# Patient Record
Sex: MALE | Race: WHITE | NOT HISPANIC OR LATINO | Employment: UNEMPLOYED | URBAN - METROPOLITAN AREA
[De-identification: names, ages, dates, MRNs, and addresses within clinical notes are randomized per-mention and may not be internally consistent; named-entity substitution may affect disease eponyms.]

---

## 2023-04-12 ENCOUNTER — HOSPITAL ENCOUNTER (EMERGENCY)
Facility: CLINIC | Age: 28
Discharge: HOME | End: 2023-04-12
Attending: EMERGENCY MEDICINE | Admitting: EMERGENCY MEDICINE
Payer: COMMERCIAL

## 2023-04-12 VITALS
HEART RATE: 72 BPM | HEIGHT: 73 IN | WEIGHT: 182 LBS | BODY MASS INDEX: 24.12 KG/M2 | RESPIRATION RATE: 16 BRPM | SYSTOLIC BLOOD PRESSURE: 116 MMHG | DIASTOLIC BLOOD PRESSURE: 72 MMHG | OXYGEN SATURATION: 99 % | TEMPERATURE: 98.3 F

## 2023-04-12 DIAGNOSIS — F32.4 MAJOR DEPRESSIVE DISORDER, SINGLE EPISODE, IN PARTIAL REMISSION (H): ICD-10-CM

## 2023-04-12 DIAGNOSIS — F41.1 GAD (GENERALIZED ANXIETY DISORDER): ICD-10-CM

## 2023-04-12 LAB — SARS-COV-2 RNA RESP QL NAA+PROBE: NEGATIVE

## 2023-04-12 PROCEDURE — C9803 HOPD COVID-19 SPEC COLLECT: HCPCS

## 2023-04-12 PROCEDURE — U0005 INFEC AGEN DETEC AMPLI PROBE: HCPCS | Performed by: EMERGENCY MEDICINE

## 2023-04-12 PROCEDURE — 90791 PSYCH DIAGNOSTIC EVALUATION: CPT

## 2023-04-12 PROCEDURE — 250N000013 HC RX MED GY IP 250 OP 250 PS 637: Performed by: EMERGENCY MEDICINE

## 2023-04-12 PROCEDURE — 99285 EMERGENCY DEPT VISIT HI MDM: CPT | Mod: 25

## 2023-04-12 RX ORDER — FLUOXETINE 10 MG/1
10 CAPSULE ORAL DAILY
Qty: 30 CAPSULE | Refills: 0 | Status: SHIPPED | OUTPATIENT
Start: 2023-04-12

## 2023-04-12 RX ORDER — HYDROXYZINE PAMOATE 25 MG/1
25 CAPSULE ORAL 3 TIMES DAILY PRN
Qty: 20 CAPSULE | Refills: 0 | Status: SHIPPED | OUTPATIENT
Start: 2023-04-12

## 2023-04-12 RX ORDER — BUPROPION HYDROCHLORIDE 150 MG/1
150 TABLET ORAL EVERY MORNING
COMMUNITY
End: 2023-04-12

## 2023-04-12 RX ORDER — OLANZAPINE 5 MG/1
5 TABLET, ORALLY DISINTEGRATING ORAL ONCE
Status: COMPLETED | OUTPATIENT
Start: 2023-04-12 | End: 2023-04-12

## 2023-04-12 RX ADMIN — OLANZAPINE 5 MG: 5 TABLET, ORALLY DISINTEGRATING ORAL at 08:57

## 2023-04-12 ASSESSMENT — COLUMBIA-SUICIDE SEVERITY RATING SCALE - C-SSRS
TOTAL  NUMBER OF ABORTED OR SELF INTERRUPTED ATTEMPTS LIFETIME: NO
2. HAVE YOU ACTUALLY HAD ANY THOUGHTS OF KILLING YOURSELF?: NO
1. HAVE YOU WISHED YOU WERE DEAD OR WISHED YOU COULD GO TO SLEEP AND NOT WAKE UP?: NO
6. HAVE YOU EVER DONE ANYTHING, STARTED TO DO ANYTHING, OR PREPARED TO DO ANYTHING TO END YOUR LIFE?: NO
TOTAL  NUMBER OF INTERRUPTED ATTEMPTS LIFETIME: NO
ATTEMPT LIFETIME: NO

## 2023-04-12 ASSESSMENT — ACTIVITIES OF DAILY LIVING (ADL)
ADLS_ACUITY_SCORE: 35

## 2023-04-12 NOTE — ED PROVIDER NOTES
EmPATH Unit - Psychiatric Consultation  [facility] Emergency Department    Nadveep James MRN: [id number]   Age: 27 year old YOB: 1995     History     Chief Complaint   Patient presents with     Psychiatric Evaluation     HPI  Navdeep James is a 27 year old male with history notable for anxiety, ADHD, Tourette's disorder, and a depressive disorder who presented to the emergency department with family reporting concern for worsening mental health symptoms.  Records indicate a few nights of insomnia and heightened anxiety related to a romantic interest.  He was determined to be medically stable and transferred to the EmPATH unit for psychiatric assessment.  Since his arrival, there have been no acute issues.  On examination, the patient mostly highlighted symptoms of anxiety which have recently heightened.  He attributed this recent symptom intensity as being related to a girl whom he likes and desires a relationship with however these feelings are not shared by her.  He often thinks about this topic to an obsessive degree, leading to a sense of helplessness and worry that their relationship may never occur.  He feels pressured to be productive throughout the day however finds that he often ruminates on topics that worsening his anxiety and end with an unproductive day.  This anxious thought process has led to a few nights of insomnia.  During the day he feels tired and exhausted.  He notes decreased enjoyment in activities and loss of motivation to engage in them.  Concentration has been less than optimal.  Appetite has been lower than usual.  He denied suicidal and homicidal thoughts.  He denied psychotic symptoms.  Past medication trials have included Lexapro for treatment of a depressive episode a few years ago.  He did not find the medication very helpful which led to a trial of Wellbutrin.  He responded better to that medication however eventually discontinued it due to concerns for long-term  "use.  He explains that he and his family have researched side effects to antidepressant medications which has led to some hesitancy and another medication trial.  Given the recent symptom intensity which preceded his arrival, he appears more open to trying a medication however clarifies that he would like to utilize a low dose if possible.  For sleep, he has tried melatonin and magnesium and finds these medications effective.  He is not currently seeing a therapist and is not taking psychotropic medications currently.  He anticipates discharge home later today.    Past Medical History  Past Medical History:   Diagnosis Date     ADHD (attention deficit hyperactivity disorder)      OCD (obsessive compulsive disorder)      Tourette's syndrome      No past surgical history on file.  B Complex Vitamins (VITAMIN B COMPLEX PO)      No Known Allergies  Family History  Family History   Problem Relation Age of Onset     Heart Disease Maternal Grandfather         MI     Cancer Paternal Grandfather         Lung     Social History           Review of Systems  A medically appropriate review of systems was performed with pertinent positives and negatives noted in the HPI, and all other systems negative.    Physical Examination   BP: (!) 164/93  Pulse: 96  Temp: 98.1  F (36.7  C)  Resp: 14  Height: 185.4 cm (6' 1\")  Weight: 82.6 kg (182 lb)  SpO2: 100 %    Physical Exam  General: Appears stated age.   Neuro: Alert and fully oriented. Extremities appear to demonstrate normal strength on visual inspection.   Integumentary/Skin: no rash visualized, normal color    Psychiatric Examination   Appearance: awake, alert  Attitude:  cooperative  Eye Contact:  fair  Mood:  anxious  Affect:  appropriate and in normal range  Speech:  clear, coherent  Psychomotor Behavior:  no evidence of tardive dyskinesia, dystonia, or tics  Thought Process:  logical and linear  Associations:  no loose associations  Thought Content:  no evidence of suicidal " ideation or homicidal ideation and no evidence of psychotic thought  Insight:  fair  Judgement:  fair  Oriented to:  time, person, and place  Attention Span and Concentration:  fair  Recent and Remote Memory:  fair  Language: able to name/identify objects without impairment  Fund of Knowledge: intact with awareness of current and past events    ED Course        Labs Ordered and Resulted from Time of ED Arrival to Time of ED Departure   COVID-19 VIRUS (CORONAVIRUS) BY PCR - Normal       Result Value    SARS CoV2 PCR Negative         Assessments & Plan (with Medical Decision Making)   Patient presenting with worsening anxiety in the context of existential and relational stressors, giving rise to reemerging depressive symptoms.  His treatment plan was focused on resuming antidepressant medication treatment while optimizing his outpatient treatment plan. Nursing notes reviewed noting no acute issues.     I have reviewed the assessment completed by the [facility].     Preliminary diagnosis:    ICD-10-CM    1. MORGAN (generalized anxiety disorder)  F41.1       2. Major depressive disorder, single episode, in partial remission (H)  F32.4            Treatment Plan:  -Begin Prozac 10 mg daily for antidepressant and antianxiety treatment.  Risks and benefits were reviewed.  -Vistaril 25 mg 3 times a day as needed for reduction of acute anxiety until Prozac is able to provide him with therapeutic benefit  -Resume melatonin 3 mg nightly for insomnia.  Consider trazodone as an alternative if he is not responding adequately to melatonin.  -We will attempt to obtain a copy of his past GeneSight testing to ensure adequate metabolism of the medications that are being prescribed today.  -Referral for outpatient medication management and psychotherapy  -The patient is requesting to discharge home today.  At this time, he does not meet criteria to be placed under a 72-hour hold and will be discharged home.    After a period of working with the  treatment team on the EmPATH unit, the patient's mental state improved to allow a safe transition to outpatient care. After counseling on the diagnosis, work-up, and treatment plan, the patient was discharged. Close follow-up with a psychiatrist and/or therapist was recommended and community psychiatric resources were provided. Patient is to return to the ED if any urgent or potentially life-threatening concerns.     At the time of discharge, the patient's acute suicide risk was determined to be low due to the following factors: Reduction in the intensity of mood/anxiety symptoms that preceded the admission, denial of suicidal thoughts, denies feeling helpless or helpless, not currently under the influence of alcohol or illicit substances, denies experiencing command hallucinations, no immediate access to firearms. The patient's acute risk could be higher if noncompliant with their treatment plan, medications, follow-up appointments or using illicit substances or alcohol. Protective factors include: social supports, stable housing      --  Jose Sy MD   [facility] EMERGENCY DEPT  EmPATH Unit  4/12/2023      Jose Sy MD  04/12/23 2250

## 2023-04-12 NOTE — CONSULTS
Diagnostic Evaluation Consultation  Crisis Assessment    Patient was assessed: In Person  Patient location: EmPATH  Was a release of information signed: No. Reason: no established providers      Referral Data and Chief Complaint  Navdeep James is a 27 year old, who uses he/him pronouns, and presents to the ED with family/friends. Patient is referred to the ED by self. Patient is presenting to the ED for the following concerns: increased anxiety, depression, with episode of confusion, agitation, possible delusions over the past 1-2 days with broken sleep.     Informed Consent and Assessment Methods     Patient is his own guardian. Writer met with patient and explained the crisis assessment process, including applicable information disclosures and limits to confidentiality, assessed understanding of the process, and obtained consent to proceed with the assessment. Patient was observed to be able to participate in the assessment as evidenced by being alert and orieneted and agreeing to this assessment. Assessment methods included conducting a formal interview with patient, review of medical records, collaboration with medical staff, and obtaining relevant collateral information from family and community providers when available..     Over the course of this crisis assessment provided reassurance, offered validation, engaged patient in problem solving and disposition planning, worked with patient on safety and aftercare planning and provided psychoeducation. Patient's response to interventions was engaged, interested.     Summary of Patient Situation  Pt reports that he's been more stressed recently with school, recent autobody competition, having a crush on a girl, not having a job, having one best friend who moved away and another who stopped talking with him for an unknown reason, leaving him with no real support other than from his parents. All of this and issues he's been having with his stomach, have led to  difficulty sleeping.  He reports that he gets 6-8hrs of sleep but he's waking up at least hourly, if not more often, and having to get up to go to the bathroom or get something for his stomach. Pt states that he thinks anxiety and lack of good sleep are what have led to the significant brain fog he's been feeling for the past few days.   Pt describes feeling more agitated, irritable at times, and just finding it difficult to think clearly. He denies thoughts of suicide, denies significant depression, he is future oriented.  During this assessment, pt appeared calm, mildly guarded, but willing to engage in discussion and interested in recommendations we might have for him.     Brief Psychosocial History  Pt resides with his parents, is attending school for [employer] work and reports that he used to have two best friends with whom he'd hang out regularly. Unfortunately one moved to [place] and the other stopped speaking with him recently for an unknown reason. He still talks with the friend in [place] and is hopeful to figure things out with the friend here.   Pt likes to work on cars, even outside his school work, and reports good support from his parents.     Significant Clinical History  Pt reports seeing a therapist briefly around age 15, being on lexapro and wellbutrin about 2 years ago, stopping 'cold turkey' about a year ago.  Pt's sister has a diagnosis of bipolar I and witnessing her experiencing an episode of psychosis in 2018, and being taken to the hospital in restraints by EMS was a bit traumatic for pt.      Collateral Information  The following information was received from Tanya and Jonny James whose relationship to the patient is parents. Information was obtained in person. Their phone number is [phone] and [phone] respectively and they last had contact with patient today to bring pt to the ED.    What happened today: yesterday pt began acting strangely, appearing confused, agitated  with OCD type behaviors (no previous OCD Dx), possibly delusional about how he is able to impact his environment. He stated he thought he needed to go to the ED, but then changed his mind.  He was afraid to sleep alone last night and this morning parents suggested that they go to the ED.     What is different about patient's functioning: Has never appeared agitated, confused, manic, like he did these past couple of days.  At times seemed delusional, trying to control his surroundings.    First MDD episode about 3 years ago, started on lexapro and then wellbutrin, eventually stopped these 'cold turkey'.    Pt's sister does have Diagnosis of bipolar I, currently well managed on lithium.  May have been traumatic for pt to see her taken to hospital in ambulance when she experienced a manic episode with psychosis in 2018    Concern about alcohol/drug use: No    What do you think the patient needs: stabilization, possibly to start on new medication, appointment with new psychiatrist sooner than currently scheduled in May    Has patient made comments about wanting to kill themselves/others:  No    If d/c is recommended, can they take part in safety/aftercare planning: Yes .    Other information: Parents wonder if perhaps pt may be having symptoms of bipolar like his sister.      Risk Assessment  Elliott Suicide Severity Rating Scale Full Clinical Version:04/12/23    Suicidal Ideation  1. Wish to be Dead (Lifetime): No  2. Non-Specific Active Suicidal Thoughts (Lifetime): No     Suicidal Behavior  Actual Attempt (Lifetime): No  Has subject engaged in non-suicidal self-injurious behavior? (Lifetime): No  Interrupted Attempts (Lifetime): No  Aborted or Self-Interrupted Attempt (Lifetime): No  Preparatory Acts or Behavior (Lifetime): No  C-SSRS Risk (Lifetime/Recent)  Calculated C-SSRS Risk Score (Lifetime/Recent): No Risk Indicated        Validity of evaluation is not impacted by presenting factors during interview.    Comments regarding subjective versus objective responses to [place] tool: pt was forthcoming and his responses congruent with his affect  Environmental or Psychosocial Events: challenging interpersonal relationships and unemployment/underemployment  Chronic Risk Factors: chronic and ongoing sleep difficulties   Warning Signs: dramatic changes in mood  Protective Factors: strong bond to family unit, community support, or employment, responsibilities and duties to others, including pets and children, lives in a responsibly safe and stable environment, good treatment engagement, sense of importance of health and wellness, able to access care without barriers, help seeking, good problem-solving, coping, and conflict resolution skills, sense of belonging, sense of self-efficacy and/or positive self-esteem, cultural, spiritual , or Amish beliefs associated with meaning and value in life, optimistic outlook - identification of future goals, constructive use of leisure time, enjoyable activities, resilience and reality testing ability  Interpretation of Risk Scoring, Risk Mitigation Interventions and Safety Plan: No risk identified.  Will return home with parents     Does the patient have thoughts of harming others? No     Is the patient engaging in sexually inappropriate behavior?  no        Current Substance Abuse     Is there recent substance abuse? no     Was a urine drug screen or blood alcohol level obtained: No       Mental Status Exam     Affect: Constricted   Appearance: Appropriate    Attention Span/Concentration: Attentive  Eye Contact: Engaged   Fund of Knowledge: Appropriate    Language /Speech Content: Fluent   Language /Speech Volume: Soft    Language /Speech Rate/Productions: Normal    Recent Memory: Intact   Remote Memory: Intact   Mood: Anxious and Normal    Orientation to Person: Yes    Orientation to Place: Yes   Orientation to Time of Day: Yes    Orientation to Date: Yes    Situation (Do they  "understand why they are here?): Yes    Psychomotor Behavior: Normal    Thought Content: Clear   Thought Form: Goal Directed and Intact      History of commitment: No       Medication    Psychotropic medications: No current medications but a history of lexapro and wellbutrin .  Medication changes made in the last two weeks: No       Current Care Team    Primary Care Provider: No  Psychiatrist: No  Therapist: No  : No     CTSS or ARMHS: No  ACT Team: No  Other: No      Diagnosis    296.34 (F33.3) Major Depressive Disorder, Recurrent Episode, With psychotic features _  300.02 (F41.1) Generalized Anxiety Disorder       Clinical Summary and Substantiation of Recommendations    Pt appears to have moderate insight into the things that may have led up to his recent episode of agitation, significant anxiety and mild confusion or \"foggy brain\".  He also was willing to come to the ED to seek help and has been open to recommendations we have offered.  He read thru the handouts on coping techniques and tried practicing a few of the coping techniques after brief discussion with me about other coping techniques he typically utilizes.    Pt has history of following a health plan for weight loss with good outcome and has intent to approach his  care in a similar fashion, breaking down the steps he can follow for caring for himself through medication compliance, exercise, support and therapy.   Pt does not appear to be a danger to himself or others at this time, he has good support from his parents at home, and he is determined to follow through with recommendations provided to him here. His parents will be coming to pick him up.     Disposition    Recommended disposition: Individual Therapy and Medication Management       Reviewed case and recommendations with attending provider. Attending Name: Dr Sy       Attending concurs with disposition: Yes       Patient and/or validated legal guardian concurs with " disposition: Yes       Final disposition: Medication management.       Outpatient Details (if applicable):   Aftercare plan and appointments placed in the AVS and provided to patient: Yes. Given to patient by RN    Was lethal means counseling provided as a part of aftercare planning? No; No SI or risk identified      Assessment Details    Patient interview started at: 1pm and completed at: 1:30pm.     Total duration spent on the patient case in minutes: 2.0 hrs      CPT code(s) utilized: 72390 - Psychotherapy for Crisis - 60 (30-74*) min       Madison Ghotra, LICSW, MSW, LICSW, Psychotherapist  DEC - Triage & Transition Services  Callback: [phone]      Aftercare Plan    Follow up with established providers and supports as scheduled. Continue taking medications as prescribed. Abstain from drugs and alcohol. Utilize your [facility] crisis team as needed. They are available 24/7. Contact information is listed below.     The following appointment was made on your behalf. If you need to make changes or cancel please contact the clinic/provider directly.     Date: Monday, 4/17/2023  Time: 3:00 pm - 4:00 pm  Provider: Suzy MARTINEZ PA-C  Location: [facility], [place] [address]  Phone: [phone]  Type: Telepsychiatry  Patient Instructions  Please complete the New Patient Form 24 hours prior to your appointment by going to [email or web address] Please call us at [phone] 24 hours prior to your scheduled appointment to confirm that you are able to attend. We will provide you information about how to log into video call software when you call.    *A list of therapists who accept your insurance and who should have upcoming available appointments, was provided to you.  Please review these online and either schedule an appointment yourself, or ask for assistance with this from the [facility] coordinator who will be contacting you in the next 1-2 days.      ----------------------------------------------------------------    If I am feeling unsafe or I am in a crisis, I will:   Contact my established care providers   Call the [place] Suicide Prevention Lifeline: [phone]   Go to the nearest emergency room   Call [phone]     Warning signs that I or other people might notice when a crisis is developing for me: changes to sleep, appetite or mood, increased anger, agitation or irritability, feeling depressed or hopeless, spending more time alone or talking less, increased crying, decreased productivity, seeing or hearing things that aren't there, thoughts of not wanting to live anymore or of actually killing myself, thoughts of hurting others    Things I am able to do on my own to cope or help me feel better: watching a favorite tv show or movie, listening to music I enjoy, going outside and breathing fresh air, going for a walk or exercising, taking a shower or bath, a cold or hot beverage, a healthy snack, drawing/coloring/painting, journaling, singing or dancing, deep breathing     I can try practicing square breathing when I begin to feel anxious - inhale through the nose for the count of 4 and the first line on the square. Exhale through the mouth for the count of 4 for the second line of the square. Repeat to complete the square. Repeat the square as many times as needed.    I can also use my five senses to practice mindfulness and grounding. What are five things I can see, four things I can hear, three things I can feel, two things I can smell, and one thing I can taste.     Things that I am able to do with others to cope or help me feel better: sometimes just talking or spending time with someone else, sharing a meal or having coffee, watching a movie or playing a game, going for a walk or exercising    I can also use community resources including mental health hotlines, [facility] crisis teams, or apps.     Things I can use or do for distraction: movies/tv, music,  "reading, games, drawing/coloring/painting or other art, essential oils, exercise, cleaning/organizing, puzzles, crossword puzzles, word search, Sudoku       I can also download a meditation or relaxation gill, like Calm, Headspace, or Insight Timer (all three offer a free version)    Changes I can make to support my mental health and wellness: Attend scheduled mental health therapy and psychiatric appointments. Take my medications as prescribed. Maintain a daily schedule/routine. Abstain from all mood altering substances, including drugs, alcohol, or medications not currently prescribed to me. Implement a self-care routine.      People in my life that I can ask for help: friends or family, trusted teachers/staff/colleagues, trusted members of my community or place of Judaism, mental health crisis lines, or 911    Your [facility] has a mental health crisis team you can call 24/7: [facility] Adult, [phone]    Other things that are important when I m in crisis: to remember that the feelings I am having right now are temporary, and it won't feel like this forever, and that it is okay and important to ask for help    Crisis Lines  Crisis Text Line  Text 980947  You will be connected with a trained live crisis counselor to provide support.    National Hope Line  1.800.SUICIDE [4267102]      Community Resources  Fast Tracker  Linking people to mental health and substance use disorder resources  fasttrackermn.org     [place] Mental Health Warm Line  Peer to peer support  Monday thru Saturday, 12 pm to 10 pm  910.353.5106 or 2.454.251.0171  Text \"Support\" to 23270    National [employer] Mental Illness (RITA)  540.297.0935 or 1.888.RITA.HELPS      Mental Health Apps  My3  https://my3app.org/    VirtualHopeBox  https://Agenus.org/apps/virtual-hope-box/      Additional Information  Today you were seen by a licensed mental health professional through Triage and Transition services, Behavioral Healthcare " Providers (MARIAJOSE)  for a crisis assessment in the Emergency Department at [facility].  It is recommended that you follow up with your established providers (psychiatrist, mental health therapist, and/or primary care doctor - as relevant) as soon as possible. Coordinators from [facility] will be calling you in the next 24-48 hours to ensure that you have the resources you need.  You can also contact [facility] coordinators directly at [phone]. You may have been scheduled for or offered an appointment with a mental health provider. [facility] maintains an extensive network of licensed behavioral health providers to connect patients with the services they need.  We do not charge providers a fee to participate in our referral network.  We match patients with providers based on a patient's specific needs, insurance coverage, and location.  Our first effort will be to refer you to a provider within your care system, and will utilize providers outside your care system as needed.

## 2023-04-12 NOTE — DISCHARGE INSTRUCTIONS
Aftercare Plan    Follow up with established providers and supports as scheduled. Continue taking medications as prescribed. Abstain from drugs and alcohol. Utilize your [facility] mental [facility] crisis team as needed. They are available 24/7. Contact information is listed below.     The following appointment was made on your behalf. If you need to make changes or cancel please contact the clinic/provider directly.     Date: Monday, 4/17/2023  Time: 3:00 pm - 4:00 pm  Provider: Suzy MARTINEZ PA-C  Location: [facility], [place] [address]  Phone: [phone]  Type: Telepsychiatry  Patient Instructions  Please complete the New Patient Form 24 hours prior to your appointment by going to [email or web address][email or web address]   Please call us at [phone] 24 hours prior to your scheduled appointment to confirm that you are able to attend. We will provide you information about how to log into video call software when you call.    *A list of therapists who accept your insurance and who should have upcoming available appointments, was provided to you.  Please review these online and either schedule an appointment yourself, or ask for assistance with this from the [facility] coordinator who will be contacting you in the next 1-2 days.     ----------------------------------------------------------------    If I am feeling unsafe or I am in a crisis, I will:   Contact my established care providers   Call the National Suicide Prevention Lifeline: 125.537.7868   Go to the nearest emergency room   Call 911     Warning signs that I or other people might notice when a crisis is developing for me: changes to sleep, appetite or mood, increased anger, agitation or irritability, feeling depressed or hopeless, spending more time alone or talking less, increased crying, decreased productivity, seeing or hearing things that aren't there, thoughts of not wanting to live anymore or of actually killing myself, thoughts of hurting  others    Things I am able to do on my own to cope or help me feel better: watching a favorite tv show or movie, listening to music I enjoy, going outside and breathing fresh air, going for a walk or exercising, taking a shower or bath, a cold or hot beverage, a healthy snack, drawing/coloring/painting, journaling, singing or dancing, deep breathing     I can try practicing square breathing when I begin to feel anxious - inhale through the nose for the count of 4 and the first line on the square. Exhale through the mouth for the count of 4 for the second line of the square. Repeat to complete the square. Repeat the square as many times as needed.    I can also use my five senses to practice mindfulness and grounding. What are five things I can see, four things I can hear, three things I can feel, two things I can smell, and one thing I can taste.     Things that I am able to do with others to cope or help me feel better: sometimes just talking or spending time with someone else, sharing a meal or having coffee, watching a movie or playing a game, going for a walk or exercising    I can also use community resources including mental health hotlines, [facility] crisis teams, or apps.     Things I can use or do for distraction: movies/tv, music, reading, games, drawing/coloring/painting or other art, essential oils, exercise, cleaning/organizing, puzzles, crossword puzzles, word search, Sudoku       I can also download a meditation or relaxation gill, like Calm, Headspace, or Insight Timer (all three offer a free version)    Changes I can make to support my mental health and wellness: Attend scheduled mental health therapy and psychiatric appointments. Take my medications as prescribed. Maintain a daily schedule/routine. Abstain from all mood altering substances, including drugs, alcohol, or medications not currently prescribed to me. Implement a self-care routine.      People in my life that I can ask for help: friends or  "family, trusted teachers/staff/colleagues, trusted members of my community or place of Voodoo, mental health crisis lines, or 911    Your [facility] has a mental health crisis team you can call 24/7: [facility], [phone]    Other things that are important when I m in crisis: to remember that the feelings I am having right now are temporary, and it won't feel like this forever, and that it is okay and important to ask for help    Crisis Lines  Crisis Text Line  Text 483747  You will be connected with a trained live crisis counselor to provide support.    National Hope Line  1.800.SUICIDE [6732403]      Community Resources  Fast Tracker  Linking people to mental health and substance use disorder resources  [employer]     [place] Mental [facility] Warm Line  Peer to peer support  Monday thru Saturday, 12 pm to 10 pm  852.647.2670 or 9.477.080.2678  Text \"Support\" to 36763    National [employer] on Mental Illness (RITA)  820.256.8103 or 1.888.RITA.HELPS      Mental Health Apps  My3  https://Gustopp.org/    VirtualHopeBox  https://[employer]org/apps/virtual-hope-box/      Additional Information  Today you were seen by a licensed mental health professional through Triage and Transition services, [facility] (P)  for a crisis assessment in the Emergency Department at [facility].  It is recommended that you follow up with your established providers (psychiatrist, mental health therapist, and/or primary care doctor - as relevant) as soon as possible. Coordinators from [facility] will be calling you in the next 24-48 hours to ensure that you have the resources you need.  You can also contact [facility] coordinators directly at [phone]. You may have been scheduled for or offered an appointment with a mental health provider. [facility] maintains an extensive network of licensed behavioral health providers to connect patients with the services they need.  We do not charge providers a fee " to participate in our referral network.  We match patients with providers based on a patient's specific needs, insurance coverage, and location.  Our first effort will be to refer you to a provider within your care system, and will utilize providers outside your care system as needed.

## 2023-04-12 NOTE — PROGRESS NOTES
"27 year old male with history of anxiety, ADHD, tourette's, depression received from ED due to worsening anxiety, insomnia. Pt reports 2 months of anxiety, feeling \"foggy thoughts\" and \"not myself\". States he sleeps 4-6 hours per night and wakes due to acid reflux and \"stomach burning\". Pt reports he stopped taking Prilosec about a month ago which had been helping his acid reflux however he stopped due to side effects. He states \"I don't know\" when asked what side effects were concerning to him. Pt has tried lexapro in the past which was not helpful. He had been taking wellbutrin but weened off due to \"not needing it anymore\". Pt states since stopping these medications he has been increasingly anxious. He reports \"there's been a lot of events that started to pile up\" but declines to share with this writer specific events. 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 He is living with his parents. Denies SI/HI/AH/VH.      Nursing and risk assessments completed. Assessments reviewed with LMHP and physician. Admission information reviewed with patient. Patient given a tour of [facility] and instructions on using the facility. Questions regarding [facility] addressed. Pt safety search completed.     "

## 2023-04-12 NOTE — ED PROVIDER NOTES
History     Chief Complaint:  Psychiatric Evaluation       HPI   Navdeep James is a 27 year old male with a history of ADHD, Tourette's, anxiety, depression who presents with mental health evaluation.  The patient states he has had racing thoughts for over 4 days.  It does not allow him to sleep well at night.  He is stressed over many things including relationships with a girl, his new diet and exercise regimen which is resulting in weight loss which is intended but it feels like he has been thrown off balance and this is stressful for him.  He stopped his Wellbutrin a year ago.  He stopped his Prilosec as well.  He feels like stopping the Prilosec is causing some of his symptoms.  He did go to [facility] 2 days ago for assessment.  And they provided him outpatient referrals.  One of the referrals would not take his insurance and so they got established with Mar[facility] however do not have an appointment until May.  Mom and dad brought him in here wanting him to get help.  The patient does not feel suicidal or homicidal.  He has a very labile mood.  He has had recent URI symptoms including ear fullness and runny nose.  He did home COVID test which was negative.  The patient denies any vaping, smoking, alcohol or recreational drug use.  He does not have a job.      Independent Historian:   Parent - They report Parents report that he has been on Lexapro in the past they state they have done genetic testing and this does not work for him so they do not want him to be on it.    Review of External Notes:   Reviewed a emergency room note from [facility] on April 10, 2023.  The patient was seen by Dr. Skinner.  He was assessed by mental health for racing thoughts for 3 days no suicidal thoughts..  He did have blood work including a basic metabolic panel which was normal.  Urine analysis was normal.  He was set up with outpatient therapy and medication assessment.    ROS:  Review of Systems    Allergies:  No Known Allergies      Medications:    B Complex Vitamins (VITAMIN B COMPLEX PO)  MAGNESIUM CITRATE PO  polyethylene glycol (MIRALAX) powder  tolterodine (DETROL LA) 4 MG 24 hr capsule        Past Medical History:    Past Medical History:   Diagnosis Date     ADHD (attention deficit hyperactivity disorder)      OCD (obsessive compulsive disorder)      Tourette's syndrome        Past Surgical History:    No past surgical history on file.     Family History:    family history includes Cancer in his paternal grandfather; Heart Disease in his maternal grandfather.    Social History:     PCP: Mary Jara     Physical Exam     Patient Vitals for the past 24 hrs:   BP Temp Temp src Pulse Resp SpO2   04/12/23 0857 -- 98.1  F (36.7  C) Temporal -- 14 --   04/12/23 0845 -- -- -- -- -- 100 %   04/12/23 0843 (!) 164/93 -- -- 96 -- --        Physical Exam    Physical Exam   Constitutional:  Patient is oriented to person, place, and time. They appear well-developed and well-nourished. Mild distress secondary to stress and anxiety   HENT:   Mouth/Throat:   Oropharynx is clear and moist.   Eyes:    Conjunctivae normal and EOM are normal. Pupils are equal, round, and reactive to light.   Neck:    Normal range of motion.   Cardiovascular: Normal rate, regular rhythm and normal heart sounds.  Exam reveals no gallop and no friction rub.  No murmur heard.  Pulmonary/Chest:  Effort normal and breath sounds normal. Patient has no wheezes. Patient has no rales.   Abdominal:   Soft. Bowel sounds are normal. Patient exhibits no mass. There is no tenderness. There is no rebound and no guarding.   Musculoskeletal:  Normal range of motion. Patient exhibits no edema.   Neurological:   Patient is alert and oriented to person, place, and time. Patient has normal strength. No cranial nerve deficit or sensory deficit. GCS 15  Skin:   Skin is warm and dry. No rash noted. No erythema.   Psychiatric:   Patient is going between laughing and crying.  Appears to be  mildly anxious.  Denies feeling suicidal or homicidal.        Emergency Department Course     Laboratory:  Labs Ordered and Resulted from Time of ED Arrival to Time of ED Departure   COVID-19 VIRUS (CORONAVIRUS) BY PCR - Normal       Result Value    SARS CoV2 PCR Negative          Procedures   None    Emergency Department Course & Assessments:             Interventions:  Medications   OLANZapine zydis (zyPREXA) ODT tab 5 mg (5 mg Oral $Given 4/12/23 5588)        Assessments:  0835 assessed the patient in behavioral health.    Independent Interpretation (X-rays, CTs, rhythm strip):  None    Consultations/Discussion of Management or Tests:  0940 discussed the case with Antonio from [facility].  He agrees that the patient is wide, disorganized, manic.  Agrees for inpatient [facility].  I then placed him on a 72-hour hold as the patient is involuntary       Social Determinants of Health affecting care:   None    Disposition:  The patient was transferred to [facility].     Impression & Plan    CMS Diagnoses: None    Medical Decision Making:  Navdeep James is a 27-year-old male presenting to the emergency department for mental health evaluation.  He has had racing thoughts for the past 4 days.  He is having difficulty sleeping.  He is quite anxious.  He is depressed but not suicidal.  He does not feel like he can manage at home.  He thinks that taking himself off of Prilosec suddenly threw him off balance.  He has not been on Wellbutrin for over a year.  He did try to manage with outpatient follow-up however was not able to be seen in a timely manner and they also declined his insurance.  His mother and father bring him in.  They did agree to go to [facility].  A COVID swab was negative.  They had basic blood work done at [facility] 2 days ago which were unremarkable.  At this time he is appropriate for [facility].  His COVID was negative.  No further testing is required.  He is medically clear.        Diagnosis:    ICD-10-CM    1.  Other depression  F32.89       2. Anxiety  F41.9            Discharge Medications:  New Prescriptions    No medications on file             Ave Smith MD  04/12/23 0960